# Patient Record
Sex: MALE | Race: WHITE | NOT HISPANIC OR LATINO | Employment: PART TIME | URBAN - METROPOLITAN AREA
[De-identification: names, ages, dates, MRNs, and addresses within clinical notes are randomized per-mention and may not be internally consistent; named-entity substitution may affect disease eponyms.]

---

## 2021-05-27 ENCOUNTER — OFFICE VISIT (OUTPATIENT)
Dept: SURGERY | Facility: CLINIC | Age: 31
End: 2021-05-27

## 2021-05-27 VITALS
RESPIRATION RATE: 18 BRPM | DIASTOLIC BLOOD PRESSURE: 84 MMHG | SYSTOLIC BLOOD PRESSURE: 124 MMHG | BODY MASS INDEX: 34.44 KG/M2 | WEIGHT: 282.8 LBS | HEART RATE: 87 BPM | TEMPERATURE: 97.7 F | OXYGEN SATURATION: 98 % | HEIGHT: 76 IN

## 2021-05-27 DIAGNOSIS — L05.91 PILONIDAL CYST: Primary | ICD-10-CM

## 2021-05-27 PROCEDURE — 99203 OFFICE O/P NEW LOW 30 MIN: CPT | Performed by: SURGERY

## 2021-05-27 RX ORDER — ESCITALOPRAM OXALATE 10 MG/1
10 TABLET ORAL DAILY
COMMUNITY

## 2021-05-27 RX ORDER — AMLODIPINE BESYLATE 10 MG/1
10 TABLET ORAL DAILY
COMMUNITY

## 2021-05-27 NOTE — PROGRESS NOTES
"Subjective   Marin Chanel is a 30 y.o. male.   Chief Complaint   Patient presents with   • New Patient     Ref Dr. Motta, Pilonidal Cyst      /84 (BP Location: Left arm, Patient Position: Sitting, Cuff Size: Large Adult)   Pulse 87   Temp 97.7 °F (36.5 °C) (Infrared)   Resp 18   Ht 193 cm (76\")   Wt 128 kg (282 lb 12.8 oz)   SpO2 98%   BMI 34.42 kg/m²     HISTORY OF PRESENT ILLNESS:  30-year-old gentleman who was referred to me for evaluation of inflammation and drainage of his gluteal cleft.  He says that about 17 years ago was his initial flareup where he noticed some swelling at the top of his gluteal cleft.  He underwent incision and drainage and packing of this area which lasted him for about 15 years.  He says about 2 to 3 years ago he started to notice some flareups.  They occur about once every 1 to 2 months they cause some mild bloody drainage and then resolve spontaneously there never significantly painful he does not think they have ever gotten infected.  It has no effect on his ability to move his bowels.  He was seen by his primary care doctor who evaluated him for this and referred him to me for consideration for surgical therapy.       Outpatient Encounter Medications as of 5/27/2021   Medication Sig Dispense Refill   • amLODIPine (NORVASC) 10 MG tablet Take 10 mg by mouth Daily.     • escitalopram (LEXAPRO) 10 MG tablet Take 10 mg by mouth Daily.       No facility-administered encounter medications on file as of 5/27/2021.         The following portions of the patient's history were reviewed and updated as appropriate: allergies, current medications, past family history, past medical history, past social history, past surgical history and problem list.    Review of Systems  A complete review of systems been obtained is negative except as stated in HPI  Objective   Physical Exam  Constitutional:       Appearance: Normal appearance. He is well-developed.   HENT:      Head: " Normocephalic and atraumatic.   Eyes:      General: No scleral icterus.     Conjunctiva/sclera: Conjunctivae normal.   Neck:      Trachea: No tracheal deviation.   Cardiovascular:      Rate and Rhythm: Normal rate.      Pulses: Normal pulses.   Pulmonary:      Effort: Pulmonary effort is normal. No respiratory distress.   Abdominal:      General: There is no distension.      Palpations: Abdomen is soft.   Genitourinary:     Comments: In the superior gluteal cleft there is some fullness with 1-2 areas of what looks like a sinus tract with some minimal serosanguineous drainage no significant surrounding induration or erythema of the skin minimal tenderness to palpation  Musculoskeletal:         General: No swelling or tenderness.      Cervical back: Neck supple. No tenderness. No muscular tenderness.   Skin:     General: Skin is warm and dry.   Neurological:      General: No focal deficit present.      Mental Status: He is alert. Mental status is at baseline.   Psychiatric:         Mood and Affect: Mood normal.         Behavior: Behavior normal.           Assessment/Plan   Diagnoses and all orders for this visit:    1. Pilonidal cyst (Primary)    I talked about the likely diagnosis of a pilonidal cyst the natural history of these and the surgical management.  We talked about excision with healing by secondary intention versus primary closure.  We talked also the alternative diagnosis like anal fistula but I do not believe that that is what is going on with him.  After our discussion about the surgical management the natural history of this disease process.  He is going to continue to manage it nonoperatively.  He is can I have my information to call me if it does not get infected which could require incision drainage or if he just continues to cause some trouble I would be willing to perform an excision whenever he is ready for surgery.  He is to call me down the road for reevaluation when necessary.    Samy FRIEDMAN  MD Romel  5/27/2021  1:32 PM EDT    This note was created using Dragon Voice Recognition software.